# Patient Record
Sex: FEMALE | URBAN - METROPOLITAN AREA
[De-identification: names, ages, dates, MRNs, and addresses within clinical notes are randomized per-mention and may not be internally consistent; named-entity substitution may affect disease eponyms.]

---

## 2022-03-21 ENCOUNTER — NURSE TRIAGE (OUTPATIENT)
Dept: ADMINISTRATIVE | Facility: CLINIC | Age: 27
End: 2022-03-21

## 2022-03-21 NOTE — TELEPHONE ENCOUNTER
[facility] incoming call -  Pt reports abnormal vaginal bleeding. Pt is located in [place] - Abnormal vaginal bleeding protocol followed and pt advised on home care and to f/u with PCP .    Reason for Disposition   MILD bleeding or SPOTTING and could be pregnant (e.g., missed last period)    Additional Information   Negative: Passed out (i.e., fainted, collapsed and was not responding)   Negative: Difficult to awaken or acting confused (e.g., disoriented, slurred speech)   Negative: Shock suspected (e.g., cold/pale/clammy skin, too weak to stand)   Negative: SEVERE bleeding (e.g., continuous red blood from vagina, or large blood clots) and very weak (can't stand)   Negative: Sounds like a life-threatening emergency to the triager   Negative: Vaginal discharge is the main symptom and bleeding is slight   Negative: SEVERE abdominal pain (e.g., excruciating)   Negative: SEVERE dizziness (e.g., unable to stand, requires support to walk, feels like passing out now)   Negative: Passed tissue (e.g., gray-white)   Negative: SEVERE vaginal bleeding (i.e., soaking 2 pads or tampons per hour and present 2 or more hours)   Negative: MODERATE vaginal bleeding (i.e., soaking pad or tampon per hour and present > 6 hours)   Negative: Pale skin (pallor) of new onset or worsening   Negative: Constant abdominal pain lasting > 2 hours   Negative: Patient sounds very sick or weak to the triager   Negative: Taking Coumadin (warfarin) or other strong blood thinner, or known bleeding disorder (e.g., thrombocytopenia)   Negative: Skin bruises or nosebleed and not caused by an injury   Negative: Bleeding/spotting after procedure (e.g., biopsy) or pelvic examination (e.g., pap smear) that persists > 3 days   Negative: Patient wants to be seen   Negative: Periods with > 6 soaked pads or tampons per day   Negative: Periods last > 7 days   Negative: Missed period has occurred 2 or more times in the last year and the cause is not  known   Negative: Menstrual cycle < 21 days OR > 35 days, and occurs more than two cycles (2 months) this past year   Negative: Bleeding or spotting between regular periods occurs more than two cycles (2 months),   Negative: Bleeding or spotting between regular periods occurs more than two cycles (2 months), and using birth control medicine (e.g., pills, patch, Depo-Provera, Implanon, vaginal ring, Mirena IUD)   Negative: Bleeding or spotting occurs after hysterectomy   Negative: Age > 39 years with irregular or excessive bleeding   Negative: Normal menstrual flow    Protocols used: ST VAGINAL BLEEDING - [id number]